# Patient Record
(demographics unavailable — no encounter records)

---

## 2024-10-28 NOTE — HEALTH RISK ASSESSMENT
[Good] : ~his/her~  mood as  good [No] : In the past 12 months have you used drugs other than those required for medical reasons? No [No falls in past year] : Patient reported no falls in the past year [0] : 2) Feeling down, depressed, or hopeless: Not at all (0) [Never] : Never [Patient reported colonoscopy was normal] : Patient reported colonoscopy was normal [HIV test declined] : HIV test declined [Hepatitis C test declined] : Hepatitis C test declined [With Family] : lives with family [Retired] : retired [Significant Other] : lives with significant other [Smoke Detector] : smoke detector [Carbon Monoxide Detector] : carbon monoxide detector [Seat Belt] :  uses seat belt [Sunscreen] : uses sunscreen [PHQ-2 Negative - No further assessment needed] : PHQ-2 Negative - No further assessment needed [de-identified] : No [de-identified] : No [de-identified] : Walking  [de-identified] : Regular  [[id number]] : 0 [Reports changes in hearing] : Reports no changes in hearing [Reports changes in vision] : Reports no changes in vision [Reports changes in dental health] : Reports no changes in dental health [ColonoscopyDate] : 01/15 [ColonoscopyComments] : Patient was informed by GI that no more colonoscopy is needed  [de-identified] : Fidulce

## 2024-10-28 NOTE — ASSESSMENT
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

## 2024-10-28 NOTE — HISTORY OF PRESENT ILLNESS
[FreeTextEntry1] : annual exam [de-identified] : annual exam  HTN- normal  Thalassemia- will recheck levels  states that sometimes he gets weak and lightheaded and then eats and feels better

## 2025-03-20 NOTE — HISTORY OF PRESENT ILLNESS
[Spouse] : spouse [de-identified] : The patient is a 82 year old M who presents to the office for follow up.  About 1-2 weeks ago he had viral symptoms  He treated himself supportively at home with OTC cough medication He says at the time he had an episode of chest tightness which shortly resolved and presented to complete an EKG He has a mild lingering dry cough  Denied fever, chills, dyspnea CP

## 2025-03-20 NOTE — ASSESSMENT
[FreeTextEntry1] : The patient is a 82 year old M See detailed assessment above Labs drawn in office. Appropriate medication renewal(s) provided. Provided scripts for necessary imaging and/or referrals. Further management to be completed pending lab results and/or imaging studies. All of the patient's questions and concerns were answered in detail.

## 2025-03-20 NOTE — PHYSICAL EXAM
[Normal] : normal rate, regular rhythm, normal S1 and S2 and no murmur heard [No Edema] : there was no peripheral edema [No Focal Deficits] : no focal deficits [Alert and Oriented x3] : oriented to person, place, and time

## 2025-03-20 NOTE — HEALTH RISK ASSESSMENT
[Never (0 pts)] : Never (0 points) [No] : In the past 12 months have you used drugs other than those required for medical reasons? 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